# Patient Record
Sex: MALE | Race: WHITE | NOT HISPANIC OR LATINO | Employment: PART TIME | URBAN - METROPOLITAN AREA
[De-identification: names, ages, dates, MRNs, and addresses within clinical notes are randomized per-mention and may not be internally consistent; named-entity substitution may affect disease eponyms.]

---

## 2022-12-13 ENCOUNTER — ANCILLARY PROCEDURE (OUTPATIENT)
Dept: GENERAL RADIOLOGY | Facility: OTHER | Age: 37
End: 2022-12-13
Attending: ALLERGY & IMMUNOLOGY
Payer: COMMERCIAL

## 2022-12-13 ENCOUNTER — OFFICE VISIT (OUTPATIENT)
Dept: ALLERGY | Facility: OTHER | Age: 37
End: 2022-12-13
Payer: COMMERCIAL

## 2022-12-13 VITALS
HEIGHT: 70 IN | BODY MASS INDEX: 37.24 KG/M2 | DIASTOLIC BLOOD PRESSURE: 85 MMHG | OXYGEN SATURATION: 96 % | HEART RATE: 81 BPM | WEIGHT: 260.14 LBS | SYSTOLIC BLOOD PRESSURE: 137 MMHG

## 2022-12-13 DIAGNOSIS — J45.50 NOT WELL CONTROLLED SEVERE PERSISTENT ASTHMA (H): Primary | ICD-10-CM

## 2022-12-13 DIAGNOSIS — J45.50 NOT WELL CONTROLLED SEVERE PERSISTENT ASTHMA (H): ICD-10-CM

## 2022-12-13 LAB
BASOPHILS # BLD AUTO: 0.1 10E3/UL (ref 0–0.2)
BASOPHILS NFR BLD AUTO: 1 %
EOSINOPHIL # BLD AUTO: 0.6 10E3/UL (ref 0–0.7)
EOSINOPHIL NFR BLD AUTO: 5 %
ERYTHROCYTE [DISTWIDTH] IN BLOOD BY AUTOMATED COUNT: 13 % (ref 10–15)
HCT VFR BLD AUTO: 50.9 % (ref 40–53)
HGB BLD-MCNC: 17.3 G/DL (ref 13.3–17.7)
IMM GRANULOCYTES # BLD: 0.1 10E3/UL
IMM GRANULOCYTES NFR BLD: 1 %
LYMPHOCYTES # BLD AUTO: 2.8 10E3/UL (ref 0.8–5.3)
LYMPHOCYTES NFR BLD AUTO: 26 %
MCH RBC QN AUTO: 29.7 PG (ref 26.5–33)
MCHC RBC AUTO-ENTMCNC: 34 G/DL (ref 31.5–36.5)
MCV RBC AUTO: 88 FL (ref 78–100)
MONOCYTES # BLD AUTO: 0.8 10E3/UL (ref 0–1.3)
MONOCYTES NFR BLD AUTO: 7 %
NEUTROPHILS # BLD AUTO: 6.7 10E3/UL (ref 1.6–8.3)
NEUTROPHILS NFR BLD AUTO: 60 %
NRBC # BLD AUTO: 0 10E3/UL
NRBC BLD AUTO-RTO: 0 /100
PLATELET # BLD AUTO: 421 10E3/UL (ref 150–450)
RBC # BLD AUTO: 5.82 10E6/UL (ref 4.4–5.9)
WBC # BLD AUTO: 11 10E3/UL (ref 4–11)

## 2022-12-13 PROCEDURE — 82104 ALPHA-1-ANTITRYPSIN PHENO: CPT | Mod: 90 | Performed by: ALLERGY & IMMUNOLOGY

## 2022-12-13 PROCEDURE — 36415 COLL VENOUS BLD VENIPUNCTURE: CPT | Performed by: ALLERGY & IMMUNOLOGY

## 2022-12-13 PROCEDURE — 99000 SPECIMEN HANDLING OFFICE-LAB: CPT | Performed by: ALLERGY & IMMUNOLOGY

## 2022-12-13 PROCEDURE — 86003 ALLG SPEC IGE CRUDE XTRC EA: CPT | Performed by: ALLERGY & IMMUNOLOGY

## 2022-12-13 PROCEDURE — 82103 ALPHA-1-ANTITRYPSIN TOTAL: CPT | Mod: 90 | Performed by: ALLERGY & IMMUNOLOGY

## 2022-12-13 PROCEDURE — 85025 COMPLETE CBC W/AUTO DIFF WBC: CPT | Performed by: ALLERGY & IMMUNOLOGY

## 2022-12-13 PROCEDURE — 71046 X-RAY EXAM CHEST 2 VIEWS: CPT | Mod: TC | Performed by: RADIOLOGY

## 2022-12-13 PROCEDURE — 99204 OFFICE O/P NEW MOD 45 MIN: CPT | Performed by: ALLERGY & IMMUNOLOGY

## 2022-12-13 PROCEDURE — 82785 ASSAY OF IGE: CPT | Performed by: ALLERGY & IMMUNOLOGY

## 2022-12-13 RX ORDER — MONTELUKAST SODIUM 10 MG/1
10 TABLET ORAL AT BEDTIME
Qty: 30 TABLET | Refills: 3 | Status: SHIPPED | OUTPATIENT
Start: 2022-12-13

## 2022-12-13 RX ORDER — FLUTICASONE FUROATE, UMECLIDINIUM BROMIDE AND VILANTEROL TRIFENATATE 200; 62.5; 25 UG/1; UG/1; UG/1
1 POWDER RESPIRATORY (INHALATION) DAILY
Qty: 60 EACH | Refills: 3 | Status: SHIPPED | OUTPATIENT
Start: 2022-12-13

## 2022-12-13 RX ORDER — ALBUTEROL SULFATE 0.83 MG/ML
2.5 SOLUTION RESPIRATORY (INHALATION) EVERY 6 HOURS PRN
Qty: 90 ML | Refills: 3 | Status: SHIPPED | OUTPATIENT
Start: 2022-12-13

## 2022-12-13 RX ORDER — ALBUTEROL SULFATE 90 UG/1
1-2 AEROSOL, METERED RESPIRATORY (INHALATION)
COMMUNITY
Start: 2022-03-15

## 2022-12-13 RX ORDER — BUDESONIDE AND FORMOTEROL FUMARATE DIHYDRATE 160; 4.5 UG/1; UG/1
2 AEROSOL RESPIRATORY (INHALATION) 2 TIMES DAILY
COMMUNITY
Start: 2022-09-27 | End: 2022-12-13

## 2022-12-13 RX ORDER — IPRATROPIUM BROMIDE AND ALBUTEROL SULFATE 2.5; .5 MG/3ML; MG/3ML
SOLUTION RESPIRATORY (INHALATION)
COMMUNITY
Start: 2022-03-15

## 2022-12-13 ASSESSMENT — ENCOUNTER SYMPTOMS
VOMITING: 0
EYE DISCHARGE: 0
DIARRHEA: 0
ABDOMINAL PAIN: 0
WHEEZING: 0
EYE REDNESS: 0
EYE ITCHING: 0
HEADACHES: 0
SINUS PRESSURE: 0
CHILLS: 0
FACIAL SWELLING: 0
FEVER: 0
RHINORRHEA: 1
STRIDOR: 0
COUGH: 0
SHORTNESS OF BREATH: 1
CHEST TIGHTNESS: 0
ADENOPATHY: 0
NAUSEA: 0
ACTIVITY CHANGE: 0
FATIGUE: 0

## 2022-12-13 ASSESSMENT — ASTHMA QUESTIONNAIRES: ACT_TOTALSCORE: 11

## 2022-12-13 ASSESSMENT — PAIN SCALES - GENERAL: PAINLEVEL: NO PAIN (0)

## 2022-12-13 NOTE — LETTER
12/13/2022         RE: Tomás Bernabe  [address]  [facility] [address]        Dear Colleague,    Thank you for referring your patient, Tomás Bernabe, to the [facility]. Please see a copy of my visit note below.    SUBJECTIVE:                                                                   Tomás Bernabe presents today to our Allergy Clinic at [facility] for a new patient visit. He is a 37 year old male with concerns about asthma.  The patient states that he was diagnosed with asthma in his 20s.   He used to smoke for 2 years in the past.  No history of hospitalizations for asthma flare.  Usually, his symptoms are manifested by shortness of breath, chest tightness, and cough.  He does not hear himself wheezing. I noticed that wheezing was documented on the exam during ED visits.  Typically, when he has symptoms, albuterol helps within 2 to 3 minutes. When he has an asthma flare, prednisone is helpful. He was on prednisone once or twice for the past 12 months.   He has been on Symbicort 160/4.5 mcg 2 puffs twice daily. Despite being on this regimen, he is symptomatic daily.  Uses albuterol daily.  Most of the time, it happens during the day. He rarely has symptoms at night. Perennial pattern of symptoms. Not necessarily seasonally exacerbated. Symptoms are triggered by exposure to dust, excessive humidity, strong emotions, exertion, and viral respiratory infections. He does not feel that he is worse around pets.    He has never seen an allergist or a pulmonologist. No recent spirometry.    Despite having daily asthma symptoms, he denies having frequent rhinoconjunctivitis symptoms.    He may develop some mild rhinorrhea and nasal itchiness in Spring, but symptoms are well controlled with loratadine as needed.  No history of ear tubes, sinus surgeries, or tonsillectomy/adenoidectomy.    There is no problem list on file for this  patient.      History reviewed. No pertinent past medical history.      Negative family history of: Asthma        History reviewed. No pertinent surgical history.  Social History     Socioeconomic History     Marital status:      Spouse name: None     Number of children: None     Years of education: None     Highest education level: None   Occupational History     Occupation: [employer]   Tobacco Use     Smoking status: Former     Types: Cigarettes     Smokeless tobacco: Never   Vaping Use     Vaping Use: Never used   Social History Narrative    ENVIRONMENTAL HISTORY: [place] The home is heated with a forced air. They do have central air conditioning. The patient's bedroom is furnished with carpeting in bedroom.  Pets inside the house include 2 cat(s) and 2 dog(s). There is no history of cockroach or mice infestation. There is/are 0 smokers in the house.  The house does not have a damp basement.            Review of Systems   Constitutional: Negative for activity change, chills, fatigue and fever.   HENT: Positive for congestion and rhinorrhea. Negative for ear pain, facial swelling, nosebleeds, postnasal drip, sinus pressure and sneezing.    Eyes: Negative for discharge, redness and itching.   Respiratory: Positive for shortness of breath. Negative for cough, chest tightness, wheezing and stridor.    Gastrointestinal: Negative for abdominal pain, diarrhea, nausea and vomiting.   Skin: Negative for rash.   Allergic/Immunologic: Negative for environmental allergies.   Neurological: Negative for headaches.   Hematological: Negative for adenopathy.   Psychiatric/Behavioral: Negative for self-injury.           Current Outpatient Medications:      albuterol (PROAIR HFA/PROVENTIL HFA/VENTOLIN HFA) 108 (90 Base) MCG/ACT inhaler, Inhale 1-2 puffs into the lungs, Disp: , Rfl:      albuterol (PROVENTIL) (2.5 MG/3ML) 0.083% neb solution, Take 1 vial (2.5 mg) by nebulization every  "6 hours as needed for shortness of breath, wheezing or cough, Disp: 90 mL, Rfl: 3     Fluticasone-Umeclidin-Vilanterol (TRELEGY ELLIPTA) 200-62.5-25 MCG/ACT oral inhaler, Inhale 1 puff into the lungs daily, Disp: 60 each, Rfl: 3     ipratropium - albuterol 0.5 mg/2.5 mg/3 mL (DUONEB) 0.5-2.5 (3) MG/3ML neb solution, , Disp: , Rfl:      montelukast (SINGULAIR) 10 MG tablet, Take 1 tablet (10 mg) by mouth At Bedtime, Disp: 30 tablet, Rfl: 3  Immunization History   Administered Date(s) Administered     Td (Adult), Adsorbed 01/01/2002     Tdap (Adacel,Boostrix) 03/15/2022     Allergies   Allergen Reactions     Naproxen Nausea     stomach pain       OBJECTIVE:                                                                 /85   Pulse 81   Ht 1.778 m (5' 10\")   Wt 118 kg (260 lb 2.3 oz)   SpO2 96%   BMI 37.33 kg/m          Physical Exam  Vitals and nursing note reviewed.   Constitutional:       General: He is not in acute distress.     Appearance: He is not diaphoretic.   HENT:      Head: Normocephalic and atraumatic.      Right Ear: Tympanic membrane normal.      Left Ear: Tympanic membrane, ear canal and external ear normal.      Ears:      Comments: External right ear and ear canal are underdeveloped     Nose: No mucosal edema or rhinorrhea.      Right Turbinates: Not enlarged, swollen or pale.      Left Turbinates: Not enlarged, swollen or pale.      Mouth/Throat:      Lips: Pink.      Mouth: Mucous membranes are moist.      Pharynx: Oropharynx is clear. No pharyngeal swelling, oropharyngeal exudate or posterior oropharyngeal erythema.   Eyes:      General:         Right eye: No discharge.         Left eye: No discharge.      Conjunctiva/sclera: Conjunctivae normal.   Cardiovascular:      Rate and Rhythm: Normal rate and regular rhythm.      Heart sounds: Normal heart sounds. No murmur heard.  Pulmonary:      Effort: Pulmonary effort is normal. No respiratory distress.      Breath sounds: Normal breath " sounds and air entry. No stridor, decreased air movement or transmitted upper airway sounds. No decreased breath sounds, wheezing, rhonchi or rales.   Musculoskeletal:      Cervical back: Normal range of motion.   Neurological:      Mental Status: He is alert and oriented to person, place, and time.   Psychiatric:         Mood and Affect: Mood normal.         Behavior: Behavior normal.           ASSESSMENT/PLAN:    Not well controlled severe persistent asthma    Asthma is a historical diagnosis. No wheezing on the exam, but he used albuterol this morning, approximately 2 to 3 hours before today's appointment.  - Ordered baseline chest x-ray.  -Ordered baseline PFT and FeNO.  -Ordered serum IgE for regional aeroallergen panel, serum IgE, and CBC with differential to assess for possible triggers, optimize avoidance measures, and in case if we need to consider biologics in the future.  -We will check for antitrypsin deficiency.  -Stop Symbicort.  -Start Trelegy Ellipta 200/62.5/25 mcg 1 puff once daily. Rinse/gargle/spit water after using it.  - Start montelukast 10 mg by mouth once daily at bedtime. Blackbox warning discussed.  -Use albuterol inhaler 2-4 puffs every 4 hours as needed for chest tightness/wheezing/shortness of breath/persistent cough. Use it with a spacer/chamber device.  He may interchange albuterol inhaler with albuterol nebs.    - CBC with Platelets & Differential  - IgE  - Allergen cat epithellium IgE  - Allergen dog epithelium IgE  - Allergen Surjit grass IgE  - Allergen orchard grass IgE  - Allergen vince IgE  - Allergen D farinae IgE  - Allergen D pteronyssinus IgE  - Allergen alternaria alternata IgE  - Allergen aspergillus fumigatus IgE  - Allergen cladosporium herbarum IgE  - Allergen Epicoccum purpurascens IgE  - Allergen penicillium notatum IgE  - Allergen radha white IgE  - Allergen Cedar IgE  - Allergen cottonwood IgE  - Allergen elm IgE  - Allergen maple box elder IgE  - Allergen oak  white IgE  - Allergen Red [place] IgE  - Allergen silver  birch IgE  - Allergen Tree White [place] IgE  - Allergen [place] Tree  - Allergen white pine IgE  - Allergen English plantain IgE  - Allergen giant ragweed IgE  - Allergen lamb's quarter IgE  - Allergen Mugwort IgE  - Allergen ragweed short IgE  - Allergen Sagebrush Wormwood IgE  - Allergen Sheep Sorrel IgE  - Allergen thistle Russian IgE  - Allergen Weed Nettle IgE  - Allergen, Kochia/Firebush  - Alpha 1 Antitrypsin  - General PFT Lab (Please always keep checked)  - Exhaled Nitric Oxide - FENO  - Pulmonary Function Test  - Fluticasone-Umeclidin-Vilanterol (TRELEGY ELLIPTA) 200-62.5-25 MCG/ACT oral inhaler  Dispense: 60 each; Refill: 3  - montelukast (SINGULAIR) 10 MG tablet  Dispense: 30 tablet; Refill: 3  - XR Chest 2 Views  - albuterol (PROVENTIL) (2.5 MG/3ML) 0.083% neb solution  Dispense: 90 mL; Refill: 3       Return in about 2 months (around 2/13/2023), or if symptoms worsen or fail to improve.    Thank you for allowing us to participate in the care of this patient. Please feel free to contact us if there are any questions or concerns about the patient.    Disclaimer: This note consists of symbols derived from keyboarding, dictation and/or voice recognition software. As a result, there may be errors in the script that have gone undetected. Please consider this when interpreting information found in this chart.    Danis Talavera MD, FAAAAI, FACAAI  Allergy, Asthma and Immunology     [facility]       Again, thank you for allowing me to participate in the care of your patient.        Sincerely,        Danis Talavera MD

## 2022-12-13 NOTE — NURSING NOTE
Writer demonstrated how to use a breath activated inhaler for patient.  Patient instructed to make sure cap is closed before use.  The inhaler does not need to be shaken before use.  Open the cap of the inhaler all the way until you hear a click, and see the dose counter go down by one.  Breathe out as much air as you can from your lungs.  Close your lips tightly around mouthpiece, making sure the vents are not covered by lips or fingers.  Take in a steady, long deep breath through your mouth, and hold breath for 10 seconds.  Close the cap of inhaler.  Repeat steps above if you need another dose.  Keep inhaler clean and dry.  If mouthpiece needs cleaning, it can be cleaned with dry cloth or tissue.  Patient verbalized understanding.    Writer demonstrated how to use an HFA inhaler with a chamber for patient.  Patient instructed to shake the inhaler, empty lungs, put the inhaler chamber in mouth, push down on the inhaler and breathe in at the same time and then hold breath for 10 seconds.  RN informed patient that if an audible whistle/hum is heard from chamber, they are to slow their breathing.  Patient advised to wait 30 seconds-1 minute between puffs.  Patient instructed on how to clean chamber, take the medication canister out, wash it in warm soapy water, rinse it and then let it dry over night.  RN advised pt to refer to handout with chamber for cleaning instructions.  Patient informed the chamber needs to be washed once a week or when he notices a powder buildup.  Patient verbalized understanding.     Corazon Sahu RN

## 2022-12-13 NOTE — PATIENT INSTRUCTIONS
Stop Symbicort.   -Start Trelegy Ellipta 200/62.5/25 mcg 1 puff once daily.  Make sure to rinse, gargle, spit water after using it.  -Start montelukast (Singulair) 10 mg by mouth once daily at bedtime.  If you develop some behavioral changes, stop the medicine and let us know.  -Use albuterol 2 to 4 puffs every 4 hours as needed for persistent cough/chest tightness/wheezing/shortness of breath.  Since we started you on Trelegy Ellipta there is no reason to use DuoNebs.  Albuterol nebs only should suffice.    -Get chest x-ray and blood test done today.    Call to schedule breathing test    [place]: [phone]    Do not use albuterol on the day of the breathing test if possible.         Allergy Staff Appt Hours Shot Hours Locations    Physician     Danis Talavera MD       Support Staff     Corazon MEYERS, MELANIA MEYERS, [facility]  Tuesday:   [place] :  [place]: :         [facility]: :  [facility]: 7-3 [place]        Tuesday: :: :: :: :: :20      [place]       Tues & Wed: : & Thurs: :20       Fri: 7-:20         [facility]  [address] [place] [address]  Appt Line: [phone]    [facility]  [address] [place] [address]  Appt Line: [phone]    Pulmonary Function Scheduling:  [place]: [phone]  [place]: [phone]  [phone]     Prescription Assistance    If you need assistance with your prescriptions (cost, coverage, etc) please contact: [place] [facility] [phone]    Important Scheduling Information    Appointments for skin testing: Appointment will last approximately 45 minutes.  Please call the appointment line for your clinic to schedule.  Discontinue oral antihistamines 7 days prior to the appointment.  Discontinue nasal and ocular antihistamines 4 days prior to  appointment.    Appointments for challenges (oral food challenge, penicillin testing, aspirin desensitization) If your provider instructs you to that this additional testing is indicated, it will need to be scheduled directly through the allergy department.  Please contact them via [employer] or by calling the clinic and asking to speak with the allergy team.  They will provide additional information and instructions for the appointment.  Discontinue oral antihistamines 7 days prior to the appointment.  Discontinue nasal and ocular antihistamines 4 days prior to the appointment.    Thank you for trusting us with your care. Please feel free to contact us with any questions or concerns you may have.

## 2022-12-13 NOTE — PROGRESS NOTES
SUBJECTIVE:                                                                   Tomás Bernabe presents today to our Allergy Clinic at [facility] for a new patient visit. He is a 37 year old male with concerns about asthma.  The patient states that he was diagnosed with asthma in his 20s.   He used to smoke for 2 years in the past.  No history of hospitalizations for asthma flare.  Usually, his symptoms are manifested by shortness of breath, chest tightness, and cough.  He does not hear himself wheezing. I noticed that wheezing was documented on the exam during ED visits.  Typically, when he has symptoms, albuterol helps within 2 to 3 minutes. When he has an asthma flare, prednisone is helpful. He was on prednisone once or twice for the past 12 months.   He has been on Symbicort 160/4.5 mcg 2 puffs twice daily. Despite being on this regimen, he is symptomatic daily.  Uses albuterol daily.  Most of the time, it happens during the day. He rarely has symptoms at night. Perennial pattern of symptoms. Not necessarily seasonally exacerbated. Symptoms are triggered by exposure to dust, excessive humidity, strong emotions, exertion, and viral respiratory infections. He does not feel that he is worse around pets.    He has never seen an allergist or a pulmonologist. No recent spirometry.    Despite having daily asthma symptoms, he denies having frequent rhinoconjunctivitis symptoms.    He may develop some mild rhinorrhea and nasal itchiness in Spring, but symptoms are well controlled with loratadine as needed.  No history of ear tubes, sinus surgeries, or tonsillectomy/adenoidectomy.    There is no problem list on file for this patient.      History reviewed. No pertinent past medical history.      Negative family history of: Asthma        History reviewed. No pertinent surgical history.  Social History     Socioeconomic History     Marital status:      Spouse name: None     Number of  children: None     Years of education: None     Highest education level: None   Occupational History     Occupation: [employer]   Tobacco Use     Smoking status: Former     Types: Cigarettes     Smokeless tobacco: Never   Vaping Use     Vaping Use: Never used   Social History Narrative    ENVIRONMENTAL HISTORY: [place] The home is heated with a forced air. They do have central air conditioning. The patient's bedroom is furnished with carpeting in bedroom.  Pets inside the house include 2 cat(s) and 2 dog(s). There is no history of cockroach or mice infestation. There is/are 0 smokers in the house.  The house does not have a damp basement.            Review of Systems   Constitutional: Negative for activity change, chills, fatigue and fever.   HENT: Positive for congestion and rhinorrhea. Negative for ear pain, facial swelling, nosebleeds, postnasal drip, sinus pressure and sneezing.    Eyes: Negative for discharge, redness and itching.   Respiratory: Positive for shortness of breath. Negative for cough, chest tightness, wheezing and stridor.    Gastrointestinal: Negative for abdominal pain, diarrhea, nausea and vomiting.   Skin: Negative for rash.   Allergic/Immunologic: Negative for environmental allergies.   Neurological: Negative for headaches.   Hematological: Negative for adenopathy.   Psychiatric/Behavioral: Negative for self-injury.           Current Outpatient Medications:      albuterol (PROAIR HFA/PROVENTIL HFA/VENTOLIN HFA) 108 (90 Base) MCG/ACT inhaler, Inhale 1-2 puffs into the lungs, Disp: , Rfl:      albuterol (PROVENTIL) (2.5 MG/3ML) 0.083% neb solution, Take 1 vial (2.5 mg) by nebulization every 6 hours as needed for shortness of breath, wheezing or cough, Disp: 90 mL, Rfl: 3     Fluticasone-Umeclidin-Vilanterol (TRELEGY ELLIPTA) 200-62.5-25 MCG/ACT oral inhaler, Inhale 1 puff into the lungs daily, Disp: 60 each, Rfl: 3     ipratropium - albuterol 0.5 mg/2.5  "mg/3 mL (DUONEB) 0.5-2.5 (3) MG/3ML neb solution, , Disp: , Rfl:      montelukast (SINGULAIR) 10 MG tablet, Take 1 tablet (10 mg) by mouth At Bedtime, Disp: 30 tablet, Rfl: 3  Immunization History   Administered Date(s) Administered     Td (Adult), Adsorbed 01/01/2002     Tdap (Adacel,Boostrix) 03/15/2022     Allergies   Allergen Reactions     Naproxen Nausea     stomach pain       OBJECTIVE:                                                                 /85   Pulse 81   Ht 1.778 m (5' 10\")   Wt 118 kg (260 lb 2.3 oz)   SpO2 96%   BMI 37.33 kg/m          Physical Exam  Vitals and nursing note reviewed.   Constitutional:       General: He is not in acute distress.     Appearance: He is not diaphoretic.   HENT:      Head: Normocephalic and atraumatic.      Right Ear: Tympanic membrane normal.      Left Ear: Tympanic membrane, ear canal and external ear normal.      Ears:      Comments: External right ear and ear canal are underdeveloped     Nose: No mucosal edema or rhinorrhea.      Right Turbinates: Not enlarged, swollen or pale.      Left Turbinates: Not enlarged, swollen or pale.      Mouth/Throat:      Lips: Pink.      Mouth: Mucous membranes are moist.      Pharynx: Oropharynx is clear. No pharyngeal swelling, oropharyngeal exudate or posterior oropharyngeal erythema.   Eyes:      General:         Right eye: No discharge.         Left eye: No discharge.      Conjunctiva/sclera: Conjunctivae normal.   Cardiovascular:      Rate and Rhythm: Normal rate and regular rhythm.      Heart sounds: Normal heart sounds. No murmur heard.  Pulmonary:      Effort: Pulmonary effort is normal. No respiratory distress.      Breath sounds: Normal breath sounds and air entry. No stridor, decreased air movement or transmitted upper airway sounds. No decreased breath sounds, wheezing, rhonchi or rales.   Musculoskeletal:      Cervical back: Normal range of motion.   Neurological:      Mental Status: He is alert and " oriented to person, place, and time.   Psychiatric:         Mood and Affect: Mood normal.         Behavior: Behavior normal.           ASSESSMENT/PLAN:    Not well controlled severe persistent asthma    Asthma is a historical diagnosis. No wheezing on the exam, but he used albuterol this morning, approximately 2 to 3 hours before today's appointment.  - Ordered baseline chest x-ray.  -Ordered baseline PFT and FeNO.  -Ordered serum IgE for regional aeroallergen panel, serum IgE, and CBC with differential to assess for possible triggers, optimize avoidance measures, and in case if we need to consider biologics in the future.  -We will check for antitrypsin deficiency.  -Stop Symbicort.  -Start Trelegy Ellipta 200/62.5/25 mcg 1 puff once daily. Rinse/gargle/spit water after using it.  - Start montelukast 10 mg by mouth once daily at bedtime. Blackbox warning discussed.  -Use albuterol inhaler 2-4 puffs every 4 hours as needed for chest tightness/wheezing/shortness of breath/persistent cough. Use it with a spacer/chamber device.  He may interchange albuterol inhaler with albuterol nebs.    - CBC with Platelets & Differential  - IgE  - Allergen cat epithellium IgE  - Allergen dog epithelium IgE  - Allergen Surjit grass IgE  - Allergen orchard grass IgE  - Allergen vince IgE  - Allergen D farinae IgE  - Allergen D pteronyssinus IgE  - Allergen alternaria alternata IgE  - Allergen aspergillus fumigatus IgE  - Allergen cladosporium herbarum IgE  - Allergen Epicoccum purpurascens IgE  - Allergen penicillium notatum IgE  - Allergen radha white IgE  - Allergen Cedar IgE  - Allergen cottonwood IgE  - Allergen elm IgE  - Allergen maple box elder IgE  - Allergen oak white IgE  - Allergen Red [place] IgE  - Allergen silver  birch IgE  - Allergen Tree White [place] IgE  - Allergen [place] Tree  - Allergen white pine IgE  - Allergen English plantain IgE  - Allergen giant ragweed IgE  - Allergen lamb's quarter IgE  - Allergen  Mugwort IgE  - Allergen ragweed short IgE  - Allergen Sagebrush Wormwood IgE  - Allergen Sheep Sorrel IgE  - Allergen thistle Russian IgE  - Allergen Weed Nettle IgE  - Allergen, Kochia/Firebush  - Alpha 1 Antitrypsin  - General PFT Lab (Please always keep checked)  - Exhaled Nitric Oxide - FENO  - Pulmonary Function Test  - Fluticasone-Umeclidin-Vilanterol (TRELEGY ELLIPTA) 200-62.5-25 MCG/ACT oral inhaler  Dispense: 60 each; Refill: 3  - montelukast (SINGULAIR) 10 MG tablet  Dispense: 30 tablet; Refill: 3  - XR Chest 2 Views  - albuterol (PROVENTIL) (2.5 MG/3ML) 0.083% neb solution  Dispense: 90 mL; Refill: 3       Return in about 2 months (around 2/13/2023), or if symptoms worsen or fail to improve.    Thank you for allowing us to participate in the care of this patient. Please feel free to contact us if there are any questions or concerns about the patient.    Disclaimer: This note consists of symbols derived from keyboarding, dictation and/or voice recognition software. As a result, there may be errors in the script that have gone undetected. Please consider this when interpreting information found in this chart.    Danis Talavera MD, FAAAAI, FACAAI  Allergy, Asthma and Immunology     [facility]

## 2022-12-14 NOTE — RESULT ENCOUNTER NOTE
[employer] message sent:     chest x-ray:  No acute cardiopulmonary changes.  -No changes in the plan.    Danis Talavera MD

## 2022-12-15 LAB
A ALTERNATA IGE QN: <0.1 KU(A)/L
A FUMIGATUS IGE QN: <0.1 KU(A)/L
C HERBARUM IGE QN: <0.1 KU(A)/L
CALIF WALNUT POLN IGE QN: <0.1 KU(A)/L
CAT DANDER IGG QN: 67.4 KU(A)/L
CEDAR IGE QN: <0.1 KU(A)/L
COTTONWOOD IGE QN: <0.1 KU(A)/L
D FARINAE IGE QN: 2.29 KU(A)/L
D PTERONYSS IGE QN: 1.8 KU(A)/L
DOG DANDER+EPITH IGE QN: 6.85 KU(A)/L
E PURPURASCENS IGE QN: <0.1 KU(A)/L
EAST WHITE PINE IGE QN: <0.1 KU(A)/L
IGE SERPL-ACNC: 227 KU/L (ref 0–114)
JOHNSON GRASS IGE QN: <0.1 KU(A)/L
NETTLE IGE QN: <0.1 KU(A)/L
SALTWORT IGE QN: <0.1 KU(A)/L
SHEEP SORREL IGE QN: <0.1 KU(A)/L
SILVER BIRCH IGE QN: <0.1 KU(A)/L
TIMOTHY IGE QN: 10.1 KU(A)/L
WHITE ASH IGE QN: <0.1 KU(A)/L
WHITE ELM IGE QN: <0.1 KU(A)/L
WHITE MULBERRY IGE QN: <0.1 KU(A)/L
WORMWOOD IGE QN: 0.15 KU(A)/L

## 2022-12-16 LAB
COCKSFOOT IGE QN: 10.4 KU(A)/L
COMMON RAGWEED IGE QN: 4.7 KU(A)/L
ENGL PLANTAIN IGE QN: 0.14 KU(A)/L
FIREBUSH IGE QN: <0.1 KU(A)/L
GIANT RAGWEED IGE QN: 2.47 KU(A)/L
GOOSEFOOT IGE QN: <0.1 KU(A)/L
MAPLE IGE QN: <0.1 KU(A)/L
MUGWORT IGE QN: <0.1 KU(A)/L
P NOTATUM IGE QN: <0.1 KU(A)/L
RED MULBERRY IGE QN: <0.1 KU(A)/L
WHITE OAK IGE QN: <0.1 KU(A)/L

## 2022-12-17 LAB
A1AT PHENOTYP SERPL-IMP: NORMAL
A1AT SERPL-MCNC: 145 MG/DL

## 2022-12-20 NOTE — RESULT ENCOUNTER NOTE
[employer] message sent:     CBC with differential is within normal limits.  Alpha-1 antitrypsin within normal limits.  Elevated total serum IgE, 227 (0-114) which is not uncommon for the patients with allergic rhinitis, asthma, food allergies, and/or eczema.  Serum IgE for regional aeroallergen panel with sensitivity to cat, dog, grass pollen, dust mites, and weed pollen.  - Recommend avoidance measures.  - No changes in the treatment plan.  - Depending on future symptom control, he may qualify for several asthma biologics.

## 2023-02-12 ENCOUNTER — HEALTH MAINTENANCE LETTER (OUTPATIENT)
Age: 38
End: 2023-02-12

## 2023-04-10 DIAGNOSIS — J45.50 NOT WELL CONTROLLED SEVERE PERSISTENT ASTHMA (H): ICD-10-CM

## 2023-04-10 RX ORDER — FLUTICASONE FUROATE, UMECLIDINIUM BROMIDE AND VILANTEROL TRIFENATATE 200; 62.5; 25 UG/1; UG/1; UG/1
1 POWDER RESPIRATORY (INHALATION) DAILY
Qty: 60 EACH | Refills: 3 | OUTPATIENT
Start: 2023-04-10

## 2023-04-10 NOTE — TELEPHONE ENCOUNTER
Refused Prescriptions:                       Disp   Refills    Fluticasone-Umeclidin-Vilanterol (TRELEGY *60 each3        Sig: Inhale 1 puff into the lungs daily  Refused By: ALEXANDRIA SAHU  Reason for Refusal: Patient needs appointment    Alexandria Sahu, RN

## 2023-04-10 NOTE — TELEPHONE ENCOUNTER
Pending Prescriptions:                       Disp   Refills    Fluticasone-Umeclidin-Vilanterol (TRELEGY*60 each3            Sig: Inhale 1 puff into the lungs daily    Routing refill request to provider for review/approval because:  Drug not on the [facility] refill protocol.  LOV 12/13/22.  No upcoming appt.    Corazon Sahu RN

## 2023-04-10 NOTE — TELEPHONE ENCOUNTER
Declined.  I saw the patient once, in December 2023.  At that time, symptoms were not well controlled.  I recommended 2-month follow-up, but he did not follow-up with us.    He either needs a follow-up appointment or he can request further refills from PCP.    Danis Talavera MD

## 2024-03-10 ENCOUNTER — HEALTH MAINTENANCE LETTER (OUTPATIENT)
Age: 39
End: 2024-03-10

## 2025-03-16 ENCOUNTER — HEALTH MAINTENANCE LETTER (OUTPATIENT)
Age: 40
End: 2025-03-16